# Patient Record
Sex: MALE | Race: WHITE | Employment: OTHER | URBAN - METROPOLITAN AREA
[De-identification: names, ages, dates, MRNs, and addresses within clinical notes are randomized per-mention and may not be internally consistent; named-entity substitution may affect disease eponyms.]

---

## 2023-08-02 ENCOUNTER — APPOINTMENT (OUTPATIENT)
Dept: GENERAL RADIOLOGY | Age: 88
End: 2023-08-02
Payer: MEDICARE

## 2023-08-02 ENCOUNTER — APPOINTMENT (OUTPATIENT)
Dept: CT IMAGING | Age: 88
End: 2023-08-02
Payer: MEDICARE

## 2023-08-02 ENCOUNTER — HOSPITAL ENCOUNTER (EMERGENCY)
Age: 88
Discharge: HOME | End: 2023-08-02
Attending: EMERGENCY MEDICINE
Payer: MEDICARE

## 2023-08-02 VITALS
WEIGHT: 190 LBS | SYSTOLIC BLOOD PRESSURE: 112 MMHG | BODY MASS INDEX: 28.79 KG/M2 | TEMPERATURE: 98 F | RESPIRATION RATE: 16 BRPM | HEIGHT: 68 IN | DIASTOLIC BLOOD PRESSURE: 65 MMHG | HEART RATE: 60 BPM | OXYGEN SATURATION: 97 %

## 2023-08-02 DIAGNOSIS — R19.8 ALTERNATING CONSTIPATION AND DIARRHEA: Primary | ICD-10-CM

## 2023-08-02 LAB
ALBUMIN SERPL-MCNC: 3.4 G/DL (ref 3.2–4.6)
ALBUMIN/GLOB SERPL: 0.9 (ref 0.4–1.6)
ALP SERPL-CCNC: 106 U/L (ref 50–136)
ALT SERPL-CCNC: 20 U/L (ref 12–65)
ANION GAP SERPL CALC-SCNC: 6 MMOL/L (ref 2–11)
AST SERPL-CCNC: 20 U/L (ref 15–37)
BASOPHILS # BLD: 0 K/UL (ref 0–0.2)
BASOPHILS NFR BLD: 1 % (ref 0–2)
BILIRUB SERPL-MCNC: 0.9 MG/DL (ref 0.2–1.1)
BUN SERPL-MCNC: 8 MG/DL (ref 8–23)
CALCIUM SERPL-MCNC: 8.2 MG/DL (ref 8.3–10.4)
CHLORIDE SERPL-SCNC: 102 MMOL/L (ref 101–110)
CO2 SERPL-SCNC: 27 MMOL/L (ref 21–32)
CREAT SERPL-MCNC: 1.02 MG/DL (ref 0.8–1.5)
DIFFERENTIAL METHOD BLD: ABNORMAL
EOSINOPHIL # BLD: 0.2 K/UL (ref 0–0.8)
EOSINOPHIL NFR BLD: 2 % (ref 0.5–7.8)
ERYTHROCYTE [DISTWIDTH] IN BLOOD BY AUTOMATED COUNT: 18.6 % (ref 11.9–14.6)
GLOBULIN SER CALC-MCNC: 3.6 G/DL (ref 2.8–4.5)
GLUCOSE SERPL-MCNC: 110 MG/DL (ref 65–100)
HCT VFR BLD AUTO: 37.9 % (ref 41.1–50.3)
HGB BLD-MCNC: 12.9 G/DL (ref 13.6–17.2)
IMM GRANULOCYTES # BLD AUTO: 0.1 K/UL (ref 0–0.5)
IMM GRANULOCYTES NFR BLD AUTO: 1 % (ref 0–5)
LIPASE SERPL-CCNC: 86 U/L (ref 73–393)
LYMPHOCYTES # BLD: 1.1 K/UL (ref 0.5–4.6)
LYMPHOCYTES NFR BLD: 12 % (ref 13–44)
MCH RBC QN AUTO: 30.5 PG (ref 26.1–32.9)
MCHC RBC AUTO-ENTMCNC: 34 G/DL (ref 31.4–35)
MCV RBC AUTO: 89.6 FL (ref 82–102)
MONOCYTES # BLD: 1.2 K/UL (ref 0.1–1.3)
MONOCYTES NFR BLD: 14 % (ref 4–12)
NEUTS SEG # BLD: 6.3 K/UL (ref 1.7–8.2)
NEUTS SEG NFR BLD: 71 % (ref 43–78)
NRBC # BLD: 0 K/UL (ref 0–0.2)
PLATELET # BLD AUTO: 184 K/UL (ref 150–450)
PMV BLD AUTO: 10.8 FL (ref 9.4–12.3)
POTASSIUM SERPL-SCNC: 3.6 MMOL/L (ref 3.5–5.1)
PROT SERPL-MCNC: 7 G/DL (ref 6.3–8.2)
RBC # BLD AUTO: 4.23 M/UL (ref 4.23–5.6)
SODIUM SERPL-SCNC: 135 MMOL/L (ref 133–143)
WBC # BLD AUTO: 8.8 K/UL (ref 4.3–11.1)

## 2023-08-02 PROCEDURE — 80053 COMPREHEN METABOLIC PANEL: CPT

## 2023-08-02 PROCEDURE — 99285 EMERGENCY DEPT VISIT HI MDM: CPT

## 2023-08-02 PROCEDURE — 6360000004 HC RX CONTRAST MEDICATION

## 2023-08-02 PROCEDURE — 2580000003 HC RX 258

## 2023-08-02 PROCEDURE — 85025 COMPLETE CBC W/AUTO DIFF WBC: CPT

## 2023-08-02 PROCEDURE — 83690 ASSAY OF LIPASE: CPT

## 2023-08-02 PROCEDURE — 96361 HYDRATE IV INFUSION ADD-ON: CPT

## 2023-08-02 PROCEDURE — 74018 RADEX ABDOMEN 1 VIEW: CPT

## 2023-08-02 PROCEDURE — 96360 HYDRATION IV INFUSION INIT: CPT

## 2023-08-02 PROCEDURE — 74177 CT ABD & PELVIS W/CONTRAST: CPT

## 2023-08-02 RX ORDER — SODIUM CHLORIDE 0.9 % (FLUSH) 0.9 %
10 SYRINGE (ML) INJECTION
Status: COMPLETED | OUTPATIENT
Start: 2023-08-02 | End: 2023-08-02

## 2023-08-02 RX ORDER — SODIUM CHLORIDE, SODIUM LACTATE, POTASSIUM CHLORIDE, AND CALCIUM CHLORIDE .6; .31; .03; .02 G/100ML; G/100ML; G/100ML; G/100ML
1000 INJECTION, SOLUTION INTRAVENOUS ONCE
Status: COMPLETED | OUTPATIENT
Start: 2023-08-02 | End: 2023-08-02

## 2023-08-02 RX ORDER — 0.9 % SODIUM CHLORIDE 0.9 %
100 INTRAVENOUS SOLUTION INTRAVENOUS ONCE
Status: COMPLETED | OUTPATIENT
Start: 2023-08-02 | End: 2023-08-02

## 2023-08-02 RX ADMIN — SODIUM CHLORIDE, PRESERVATIVE FREE 10 ML: 5 INJECTION INTRAVENOUS at 14:08

## 2023-08-02 RX ADMIN — SODIUM CHLORIDE 100 ML: 9 INJECTION, SOLUTION INTRAVENOUS at 14:08

## 2023-08-02 RX ADMIN — IOPAMIDOL 100 ML: 755 INJECTION, SOLUTION INTRAVENOUS at 14:07

## 2023-08-02 RX ADMIN — DIATRIZOATE MEGLUMINE AND DIATRIZOATE SODIUM 15 ML: 660; 100 LIQUID ORAL; RECTAL at 12:52

## 2023-08-02 RX ADMIN — SODIUM CHLORIDE, POTASSIUM CHLORIDE, SODIUM LACTATE AND CALCIUM CHLORIDE 1000 ML: 600; 310; 30; 20 INJECTION, SOLUTION INTRAVENOUS at 12:41

## 2023-08-02 ASSESSMENT — ENCOUNTER SYMPTOMS
CONSTIPATION: 1
CHEST TIGHTNESS: 0
DIARRHEA: 1
VOMITING: 0
ABDOMINAL PAIN: 0
WHEEZING: 0
SHORTNESS OF BREATH: 0
COLOR CHANGE: 0
NAUSEA: 0

## 2023-08-02 ASSESSMENT — PAIN SCALES - GENERAL: PAINLEVEL_OUTOF10: 0

## 2023-08-02 ASSESSMENT — PAIN - FUNCTIONAL ASSESSMENT: PAIN_FUNCTIONAL_ASSESSMENT: NONE - DENIES PAIN

## 2023-08-02 NOTE — DISCHARGE INSTRUCTIONS
Blood work reassuring. CT scan did not reveal any evidence of obstruction. Ensure drinking plenty of fluids, fiber supplementation at home. You can try an over-the-counter stool softener such as Colace. You can resume daily laxative use if you start to get backed up again. Continue with plans to follow-up with gastroenterologist.  Please return with any worsening symptoms or concerns in the interim.

## 2023-08-02 NOTE — ED NOTES
Pt scraped hand on wall during transport. Skin tear to back of L hand, middle and ring finger knuckles. Pt takes blood thinners. This RN applied coban and non stick dressing to site.      Tyler Mcclure RN  08/02/23 [id number]

## 2023-08-02 NOTE — ED PROVIDER NOTES
Emergency Department Provider Note       PCP: Rosemarie Fernandez MD   Age: 80 y.o. Sex: male     DISPOSITION Decision To Discharge 08/02/2023 02:49:50 PM       ICD-10-CM    1. Alternating constipation and diarrhea  R19.8           Medical Decision Making     Complexity of Problems Addressed:  1 stable acute illness    Data Reviewed and Analyzed:  Category 1:   I independently ordered and reviewed each unique test.         Category 2:   I interpreted the X-rays diminished gas pattern in the pelvis. I interpreted the CT Scan no evidence of obstruction. Category 3: Discussion of management or test interpretation. 68-year-old male presenting with son for evaluation of watery diarrhea for the past 4 days. Patient has history of chronic constipation has been taking daily laxatives for many years. Recently started GoLytely which has now elicited diarrhea. Patient is frustrated by lack of solid bowel movements and is seeking solutions. Patient vitally stable. Exam reassuring. Patient in no acute distress with nontender abdomen. Unclear exactly what patient's concerns are - his GoLytely appears to be working as intended to produce bowel movements but he appears to be frustrated by the watery character. I have low suspicion for infectious cause. I have low suspicion for obstruction. Given timeline of diarrhea, we will check labs to rule out metabolic abnormality and recheck abdominal x-rays to evaluate for stool burden/obstructive gas pattern. We will administer IV fluids. Labs reassuring. KUB did reveal diminished gas pattern in the pelvis therefore contrasted CT abdomen and pelvis was obtained. Ultimately no signs of obstruction were identified. Stool burden improved. I discussed findings with patient. He continues to remain asymptomatic with normal vital signs. Patient has apparently been referred to GI by PCP with first appointment in the next few days.   Patient stable for discharge at

## 2023-08-02 NOTE — ED TRIAGE NOTES
Patient brought to ED by son after he has been having some recent constipation x 10 days and started passing just liquid x 4 days ago, Xray on 7/31 showed a moderate amount of stool. Patient has been taking clear lax daily for a long time. Patient denies any abdominal pain at this time.

## 2025-01-05 ENCOUNTER — HOSPITAL ENCOUNTER (EMERGENCY)
Age: 89
Discharge: HOME | End: 2025-01-05
Payer: MEDICARE

## 2025-01-05 ENCOUNTER — APPOINTMENT (OUTPATIENT)
Dept: GENERAL RADIOLOGY | Age: 89
End: 2025-01-05
Payer: MEDICARE

## 2025-01-05 VITALS
SYSTOLIC BLOOD PRESSURE: 174 MMHG | HEIGHT: 68 IN | RESPIRATION RATE: 20 BRPM | DIASTOLIC BLOOD PRESSURE: 74 MMHG | TEMPERATURE: 98 F | BODY MASS INDEX: 28.79 KG/M2 | OXYGEN SATURATION: 100 % | HEART RATE: 80 BPM | WEIGHT: 190 LBS
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
## 2025-01-05 PROCEDURE — 99283 EMERGENCY DEPT VISIT LOW MDM: CPT

## 2025-01-05 PROCEDURE — 73090 X-RAY EXAM OF FOREARM: CPT

## 2025-01-05 ASSESSMENT — PAIN - FUNCTIONAL ASSESSMENT
PAIN_FUNCTIONAL_ASSESSMENT: NONE - DENIES PAIN
PAIN_FUNCTIONAL_ASSESSMENT: NONE - DENIES PAIN

## 2025-01-05 ASSESSMENT — LIFESTYLE VARIABLES
HOW MANY STANDARD DRINKS CONTAINING ALCOHOL DO YOU HAVE ON A TYPICAL DAY: PATIENT DOES NOT DRINK
HOW OFTEN DO YOU HAVE A DRINK CONTAINING ALCOHOL: NEVER

## 2025-01-05 NOTE — ED TRIAGE NOTES
Per ems called to the [facility] for mechanical trip and fall at 0630 prior to arrival. Denies head injury. Positive thinners. Skin tear noted to left forearm. States struck left arm on bed. Bleeding unable to be controlled at facility.

## 2025-01-05 NOTE — DISCHARGE INSTRUCTIONS
As discussed your workup is reassuring and there is no evidence of a broken bone. Please follow up with you primary care provider in the next 3-4 days to ensure the wound is healing. Since it is a skin tear, the wound is expected to keep bleeding. Please apply pressure and change dressing at least once a day or as needed. If the bleeding is not able to be controlled or if symptoms change/worsen please return to the emergency department.

## 2025-01-05 NOTE — ED PROVIDER NOTES
Emergency Department Provider Note       PCP: GRACE Lin MD   Age: 89 y.o.   Sex: male     DISPOSITION Decision To Discharge 01/05/2025 05:19:29 PM    ICD-10-CM    1. Fall, initial encounter  W19.XXXA           Medical Decision Making     Patient is a 89-year-old male with a past medical history of atrial fibrillation anticoagulated on Xarelto presenting to the emergency department after mechanical fall with skin tear at approximately 5 AM.  Physical exam is notable for bony tenderness of left forearm and right forearm, with skin tear present on left forearm.  Patient is adamant that he did not hit his head, there is no sign of any cranial wound, and no neurological abnormalities on physical exam.  Will not obtain head CT at this time.    There is no sign of acute fracture.     Attempted to clean wound, patient unable to tolerate. Injection site cleansed with chloroprep and locally anesthetized with lidocaine 1% . Wound thoroughly cleaned with sterile saline water. No visualized foreign body. Patient tolerated the procedure well. The wound is a skin tear with tissue lost and is unsuitable for primary closure. Neurovascular intact with brisk capillary refill and radial pulse 2+ BL. Bleeding well-controlled with pressure. Redressed wound with gauze and wrapping.  Discussed wound care with patient and family.  Instructed patient to follow-up with primary care provider.  Return precautions provided, patient verbalized understanding..     1 acute, uncomplicated illness or injury.  Shared medical decision making was utilized in creating the patients health plan today.  I independently ordered and reviewed each unique test.           I interpreted the X-rays No acute fracture .              History     HPI  Patient is a 89-year-old male with a past medical history of atrial fibrillation anticoagulated on Xarelto presenting to the emergency department for mechanical fall that occurred at 5 AM.  Patient states